# Patient Record
Sex: FEMALE | Race: WHITE | URBAN - METROPOLITAN AREA
[De-identification: names, ages, dates, MRNs, and addresses within clinical notes are randomized per-mention and may not be internally consistent; named-entity substitution may affect disease eponyms.]

---

## 2022-01-07 ENCOUNTER — OFFICE VISIT (OUTPATIENT)
Dept: URBAN - METROPOLITAN AREA CLINIC 19 | Facility: CLINIC | Age: 62
End: 2022-01-07
Payer: COMMERCIAL

## 2022-01-07 ENCOUNTER — WEB ENCOUNTER (OUTPATIENT)
Dept: URBAN - METROPOLITAN AREA CLINIC 19 | Facility: CLINIC | Age: 62
End: 2022-01-07

## 2022-01-07 DIAGNOSIS — K75.4 AUTOIMMUNE HEPATITIS: ICD-10-CM

## 2022-01-07 DIAGNOSIS — R79.89 ELEVATED LFTS: ICD-10-CM

## 2022-01-07 DIAGNOSIS — K76.0 FATTY LIVER: ICD-10-CM

## 2022-01-07 DIAGNOSIS — Z86.010 HISTORY OF COLON POLYPS: ICD-10-CM

## 2022-01-07 PROBLEM — 197321007 FATTY LIVER: Status: ACTIVE | Noted: 2022-01-07

## 2022-01-07 PROBLEM — 707724006 ELEVATED LIVER ENZYMES LEVEL: Status: ACTIVE | Noted: 2022-01-07

## 2022-01-07 PROBLEM — 408335007 AUTOIMMUNE HEPATITIS: Status: ACTIVE | Noted: 2022-01-07

## 2022-01-07 PROBLEM — 428283002 HISTORY OF POLYP OF COLON: Status: ACTIVE | Noted: 2022-01-07

## 2022-01-07 PROCEDURE — 99204 OFFICE O/P NEW MOD 45 MIN: CPT | Performed by: INTERNAL MEDICINE

## 2022-01-07 PROCEDURE — 99244 OFF/OP CNSLTJ NEW/EST MOD 40: CPT | Performed by: INTERNAL MEDICINE

## 2022-01-07 RX ORDER — SODIUM, POTASSIUM,MAG SULFATES 17.5-3.13G
254 ML SOLUTION, RECONSTITUTED, ORAL ORAL ONCE
Qty: 1 KIT | Refills: 0 | OUTPATIENT
Start: 2022-01-07 | End: 2022-01-08

## 2022-01-09 ENCOUNTER — TELEPHONE ENCOUNTER (OUTPATIENT)
Dept: URBAN - METROPOLITAN AREA CLINIC 19 | Facility: CLINIC | Age: 62
End: 2022-01-09

## 2022-01-13 ENCOUNTER — LAB OUTSIDE AN ENCOUNTER (OUTPATIENT)
Dept: URBAN - METROPOLITAN AREA CLINIC 19 | Facility: CLINIC | Age: 62
End: 2022-01-13

## 2022-01-13 LAB
ABSOLUTE NRBC COUNT: 0
AG RATIO: 2
ALBUMIN LEVEL: 4.2
ALK PHOS: 83
ALT: 53
ANION GAP: 9
AST: 34
BILIRUBIN TOTAL: 0.4
BUN/CREAT RATIO: 11
BUN: 8
CALCIUM LEVEL: 10.3
CHLORIDE LEVEL: 101
CO2 LEVEL: 30
CREATININE LEVEL: 0.7
GFR AFRICAN AMERICAN: >60
GFR NON AFRICAN AMERICAN: >60
GLUCOSE LEVEL: 105
HCT: 45.7
HEP A AB IGM: (no result)
HEP B CORE AB TOTAL: (no result)
HEP B SURF AB: (no result)
HEP B SURF AG: (no result)
HEP C AB: (no result)
HGB: 15.6
IGG: 813
INR: 1
MCH: 30.8
MCHC: 34.1
MCV: 90.3
MPV: 10.1
NRBC AUTO: 0
OSMO (CALC): 278
PERFORMING LAB: (no result)
PLATELETS: 274
POTASSIUM LEVEL: 3
PROTEIN TOTAL: 7
PT: 13.4
RBC: 5.06
RDW: 14.1
SODIUM LEVEL: 140
WBC: 7.9

## 2022-01-14 LAB
ANTI-SMOOTH MUSCLE AB: (no result)
ANTINUCLEAR AB, HEP-2 SUBSTRATE, S: (no result)
MITOCHONDRIAL AB M2: (no result)
PERFORMING LAB: (no result)
TOTAL IGG: (no result)

## 2022-04-13 ENCOUNTER — OFFICE VISIT (OUTPATIENT)
Dept: URBAN - METROPOLITAN AREA LAB 2 | Facility: LAB | Age: 62
End: 2022-04-13

## 2024-04-16 ENCOUNTER — OV CON (OUTPATIENT)
Dept: URBAN - METROPOLITAN AREA CLINIC 19 | Facility: CLINIC | Age: 64
End: 2024-04-16
Payer: COMMERCIAL

## 2024-04-16 ENCOUNTER — LAB (OUTPATIENT)
Dept: URBAN - METROPOLITAN AREA CLINIC 19 | Facility: CLINIC | Age: 64
End: 2024-04-16

## 2024-04-16 VITALS
HEIGHT: 64 IN | DIASTOLIC BLOOD PRESSURE: 70 MMHG | BODY MASS INDEX: 32.88 KG/M2 | TEMPERATURE: 98.1 F | OXYGEN SATURATION: 95 % | WEIGHT: 192.6 LBS | SYSTOLIC BLOOD PRESSURE: 120 MMHG | HEART RATE: 99 BPM

## 2024-04-16 DIAGNOSIS — Z86.010 COLON POLYP HISTORY: ICD-10-CM

## 2024-04-16 DIAGNOSIS — K52.9 CHRONIC DIARRHEA: ICD-10-CM

## 2024-04-16 DIAGNOSIS — R19.4 CHANGE IN BOWEL HABITS: ICD-10-CM

## 2024-04-16 PROCEDURE — 99214 OFFICE O/P EST MOD 30 MIN: CPT | Performed by: NURSE PRACTITIONER

## 2024-04-16 RX ORDER — SODIUM, POTASSIUM,MAG SULFATES 17.5-3.13G
177ML SOLUTION, RECONSTITUTED, ORAL ORAL
Qty: 1 | Refills: 0 | OUTPATIENT
Start: 2024-04-16 | End: 2024-04-17

## 2024-04-16 NOTE — HPI-TODAY'S VISIT:
1/7/2022 (Dr. Lewis):        Patient presents as a referral from Dr. Sandy Sosa for evaluation of elevated transaminases. A copy of this note will be sent to the referring provider. The patient states that she was diagnosed with autoimmune hepatitis 20 years ago, but that was in a different state and we have no records. She does not remember ever being on prednisone or azathioprine. I have doubts that she has that diagnosis, due to the fact that she has not developed any signs of cirrhosis.        On 10/19/21, the patient's LFTs were normal (AST 28, ALT 54). It is unclear if she started a statin for her cholesterol before or after these labs were drawn. The rest of her CMP and CBC were also normal. On 10/28/21, the patient had a RUQ ultrasound for "elevated liver function tests" that showed a fatty liver but no other abnormalities. According to a clinic note from 11/19/21, the patient had repeat LFTs that showed an ALT of 81 9AST 48). On 1/5/22, she had repeat labs drawn that showed an ALT of 59 (ULN 55). According to the clinic note from 11/19/21, the patient was told to stop her statin until she saw me today. The patient has risk factors for RIVERA, including obesity, hypertension, and dyslipidemia.        On a different note, the patient states that she is due for a colonoscopy due to a personal history of colon polyps. She states that when she had her last colonoscopy six years ago, she had 13 polyps removed.   4/16/2024 (Juany):  Here for c/o diarrhea, onset 11/2023. She was last seen in GI clinic by Dr. Lewis back in 2022, at that time colonoscopy was ordered but she never followed up, cancelled the procedure stating b/c she could not afford the prescription. Intrinsic liver workup was negative, and her AST and ALT were normal.  Hepatitis panel was negative. Reports she had her gallbladder was removed 6-7 yrs ago. Reports watery diarrhea 5 or more times and seems to be getting worse. She is afraifd to leave the house. Reports she has had some type of head cold past 3 days, no antibiotics. Reports she had kidney lithotripsy January this year and thinks she may have been given antibiotics during that time. No fever/chills, no recent travel. No sick contacts. No bloody or black stools. Tried metamucil and imodium and pepto but no relief. No new medications. Tried cholestyramine which did not help. Her last colonoscopy was done in [place], [place]. Had labs done 3 weeks ago and all was stable. Reports hx of COPD but not on O2. She is a current smoker.

## 2024-04-16 NOTE — PHYSICAL EXAM GASTROINTESTINAL
Abdomen , soft, diffuse upper abdominal tenderness, nondistended , no guarding or rigidity , no masses palpable , normal bowel sounds , Liver and Spleen , no hepatomegaly present , no hepatosplenomegaly , liver nontender , spleen not palpable

## 2024-06-18 ENCOUNTER — TELEPHONE ENCOUNTER (OUTPATIENT)
Dept: URBAN - METROPOLITAN AREA CLINIC 19 | Facility: CLINIC | Age: 64
End: 2024-06-18

## 2024-08-28 ENCOUNTER — OFFICE VISIT (OUTPATIENT)
Dept: URBAN - METROPOLITAN AREA LAB 2 | Facility: LAB | Age: 64
End: 2024-08-28

## 2024-08-28 ENCOUNTER — LAB OUTSIDE AN ENCOUNTER (OUTPATIENT)
Dept: URBAN - METROPOLITAN AREA CLINIC 19 | Facility: CLINIC | Age: 64
End: 2024-08-28

## 2024-08-28 LAB
GLUCOSE POC: 124
PERFORMING LAB: (no result)